# Patient Record
Sex: FEMALE | Race: BLACK OR AFRICAN AMERICAN | NOT HISPANIC OR LATINO | URBAN - METROPOLITAN AREA
[De-identification: names, ages, dates, MRNs, and addresses within clinical notes are randomized per-mention and may not be internally consistent; named-entity substitution may affect disease eponyms.]

---

## 2019-11-04 ENCOUNTER — APPOINTMENT (RX ONLY)
Dept: URBAN - METROPOLITAN AREA CLINIC 1 | Facility: CLINIC | Age: 24
Setting detail: DERMATOLOGY
End: 2019-11-04

## 2019-11-04 DIAGNOSIS — L21.8 OTHER SEBORRHEIC DERMATITIS: ICD-10-CM | Status: IMPROVED

## 2019-11-04 DIAGNOSIS — L81.0 POSTINFLAMMATORY HYPERPIGMENTATION: ICD-10-CM | Status: IMPROVED

## 2019-11-04 PROBLEM — E13.9 OTHER SPECIFIED DIABETES MELLITUS WITHOUT COMPLICATIONS: Status: ACTIVE | Noted: 2019-11-04

## 2019-11-04 PROCEDURE — 99213 OFFICE O/P EST LOW 20 MIN: CPT

## 2019-11-04 PROCEDURE — ? TREATMENT REGIMEN

## 2019-11-04 PROCEDURE — ? PRESCRIPTION

## 2019-11-04 PROCEDURE — ? COUNSELING

## 2019-11-04 RX ORDER — SULFACETAMIDE SODIUM, SULFUR 100; 50 MG/G; MG/G
EMULSION TOPICAL
Qty: 1 | Refills: 0 | Status: ERX | COMMUNITY
Start: 2019-11-04

## 2019-11-04 RX ORDER — HYDROCORTISONE BUTYRATE 1 MG/G
CREAM TOPICAL
Qty: 1 | Refills: 0 | Status: ERX | COMMUNITY
Start: 2019-11-04

## 2019-11-04 RX ADMIN — SULFACETAMIDE SODIUM, SULFUR: 100; 50 EMULSION TOPICAL at 14:29

## 2019-11-04 RX ADMIN — HYDROCORTISONE BUTYRATE: 1 CREAM TOPICAL at 14:29

## 2019-11-04 ASSESSMENT — LOCATION SIMPLE DESCRIPTION DERM
LOCATION SIMPLE: LEFT SCALP
LOCATION SIMPLE: LEFT CHEEK
LOCATION SIMPLE: RIGHT CHEEK

## 2019-11-04 ASSESSMENT — LOCATION DETAILED DESCRIPTION DERM
LOCATION DETAILED: LEFT CENTRAL MALAR CHEEK
LOCATION DETAILED: RIGHT CENTRAL MALAR CHEEK
LOCATION DETAILED: LEFT CENTRAL FRONTAL SCALP

## 2019-11-04 ASSESSMENT — LOCATION ZONE DERM
LOCATION ZONE: SCALP
LOCATION ZONE: FACE

## 2019-11-04 NOTE — PROCEDURE: TREATMENT REGIMEN
Detail Level: Zone
Plan: 11/4/19\\nBetter\\nPlan\\nContinue ketoconozole cream
Plan: 9/30/19\\nciclopirox 1 % shampoo TP Frequency: Once weekly Indication: Seborrheic dermatitis Sig: Wash scalp at least once weekly with shampoo. Let sit on scalp for 10 minutes then rinse. (2 refills)\\nbetamethasone dipropionate 0.05 % topical ointment TP Frequency: Three times a week Sig: Apply to scalp 3 x a week, at night (2 refills)\\n\\n11/4/19\\nLess than itching or flaking \\nPlan\\nStart applying avar cleanser to face bid \\nStart applying hydrocortisone apply to face bid x 2 weeks, then decrease to QD x 2 weeks \\nContinue Ketoconozole cream apply to face bid x 2 weeks, then decrease to QD x 2 weeks

## 2019-11-04 NOTE — HPI: RASH (SEBORRHEIC DERMATITIS)
How Severe Is It?: moderate
Is This A New Presentation, Or A Follow-Up?: Follow Up Seborrheic Dermatitis
Additional History: Last visit  9/30/19 ciclopirox 1 % shampoo TP Frequency: Once weekly Indication: Seborrheic dermatitis Sig: Wash scalp at least once weekly with shampoo. Let sit on scalp for 10 minutes then rinse. (2 refills) betamethasone dipropionate 0.05 % topical ointment TP Frequency: Three times a week Sig: Apply to scalp 3 x a week, at night (2 refills) \\n\\n11/4/19\\nPatient states she stops using the topicals because everything cleared and she has no itching

## 2019-11-04 NOTE — HPI: DISCOLORATION
How Severe Is Your Skin Discoloration?: moderate
Additional History: Last visit 9/30/19 Apply Ketoconazole cream to face apply morning and evening, every other day.\\n\\n11/4/19\\nPatient states she stopped the ketoconozole cream because one area cleared

## 2019-12-09 ENCOUNTER — APPOINTMENT (RX ONLY)
Dept: URBAN - METROPOLITAN AREA CLINIC 1 | Facility: CLINIC | Age: 24
Setting detail: DERMATOLOGY
End: 2019-12-09

## 2019-12-09 DIAGNOSIS — L21.8 OTHER SEBORRHEIC DERMATITIS: ICD-10-CM | Status: IMPROVED

## 2019-12-09 DIAGNOSIS — L81.0 POSTINFLAMMATORY HYPERPIGMENTATION: ICD-10-CM

## 2019-12-09 PROCEDURE — 99213 OFFICE O/P EST LOW 20 MIN: CPT

## 2019-12-09 PROCEDURE — ? COUNSELING

## 2019-12-09 PROCEDURE — ? TREATMENT REGIMEN

## 2019-12-09 PROCEDURE — ? PRESCRIPTION

## 2019-12-09 RX ORDER — MOMETASONE FUROATE 1 MG/G
OINTMENT TOPICAL
Qty: 1 | Refills: 0 | Status: ERX | COMMUNITY
Start: 2019-12-09

## 2019-12-09 RX ADMIN — MOMETASONE FUROATE: 1 OINTMENT TOPICAL at 00:00

## 2019-12-09 ASSESSMENT — LOCATION ZONE DERM
LOCATION ZONE: FACE
LOCATION ZONE: SCALP

## 2019-12-09 ASSESSMENT — LOCATION DETAILED DESCRIPTION DERM
LOCATION DETAILED: RIGHT SUPERIOR LATERAL FOREHEAD
LOCATION DETAILED: LEFT SUPERIOR FOREHEAD
LOCATION DETAILED: RIGHT CENTRAL FRONTAL SCALP

## 2019-12-09 ASSESSMENT — LOCATION SIMPLE DESCRIPTION DERM
LOCATION SIMPLE: LEFT FOREHEAD
LOCATION SIMPLE: RIGHT FOREHEAD
LOCATION SIMPLE: RIGHT SCALP

## 2019-12-09 NOTE — PROCEDURE: TREATMENT REGIMEN
Continue Regimen: Continue loprox shampoo qweekly qnd Betamethasone ointment qhs.
Detail Level: Zone
Initiate Treatment: Mometasone ointment bid x 1 week then decrease to qd x 1 week
Plan: Pt had not started the hydrocortisone cr or Avar cleanser. She had not gotten it from the pharmacy.

## 2019-12-09 NOTE — HPI: DISCOLORATION
How Severe Is Your Skin Discoloration?: moderate
Additional History: Pt states that the ketoconazole cream is applied once a day. Pt uses Aveeno brightening  cleanser and moisturizer on face. Wants ck

## 2020-01-06 ENCOUNTER — APPOINTMENT (RX ONLY)
Dept: URBAN - METROPOLITAN AREA CLINIC 1 | Facility: CLINIC | Age: 25
Setting detail: DERMATOLOGY
End: 2020-01-06

## 2020-01-06 DIAGNOSIS — L21.8 OTHER SEBORRHEIC DERMATITIS: ICD-10-CM | Status: IMPROVED

## 2020-01-06 DIAGNOSIS — L81.0 POSTINFLAMMATORY HYPERPIGMENTATION: ICD-10-CM

## 2020-01-06 PROCEDURE — ? COUNSELING

## 2020-01-06 PROCEDURE — 99213 OFFICE O/P EST LOW 20 MIN: CPT

## 2020-01-06 PROCEDURE — ? TREATMENT REGIMEN

## 2020-01-06 ASSESSMENT — LOCATION DETAILED DESCRIPTION DERM
LOCATION DETAILED: RIGHT SUPERIOR LATERAL FOREHEAD
LOCATION DETAILED: LEFT SUPERIOR FOREHEAD
LOCATION DETAILED: LEFT INFERIOR MEDIAL FOREHEAD
LOCATION DETAILED: RIGHT CENTRAL FRONTAL SCALP
LOCATION DETAILED: LEFT INFERIOR CENTRAL MALAR CHEEK

## 2020-01-06 ASSESSMENT — LOCATION SIMPLE DESCRIPTION DERM
LOCATION SIMPLE: RIGHT FOREHEAD
LOCATION SIMPLE: LEFT FOREHEAD
LOCATION SIMPLE: LEFT CHEEK
LOCATION SIMPLE: RIGHT SCALP

## 2020-01-06 ASSESSMENT — LOCATION ZONE DERM
LOCATION ZONE: SCALP
LOCATION ZONE: FACE

## 2020-01-06 NOTE — PROCEDURE: TREATMENT REGIMEN
Plan: 1/6/20:  \\nFace—improved though still appears dry\\nScalp—Better\\nPlan:  \\nCiclopirox shampoo. Weekly. 10 mins on, rinse\\nBetamethasone oint. QHS. \\nFace:  \\nd/c ketoconazole cream\\nHydrocortisone cream qd x 2 weeks to affected areas on face after moisturizer\\nContinue Aveeno cleanser \\nMoisturizer Aveeno with sunscreen qam\\nRecommend CeraVe PM moisturizer and coupon given
Detail Level: Zone
Discontinue Regimen: Ketoconazole cream
Plan: Hydrocortisone cream qd x 2 weeks. Then prn \\nWash Aveeno. Bid\\nMoisturizer Aveeno,  qam, cerave QHS

## 2020-02-17 ENCOUNTER — APPOINTMENT (RX ONLY)
Dept: URBAN - METROPOLITAN AREA CLINIC 1 | Facility: CLINIC | Age: 25
Setting detail: DERMATOLOGY
End: 2020-02-17

## 2020-02-17 DIAGNOSIS — L81.0 POSTINFLAMMATORY HYPERPIGMENTATION: ICD-10-CM

## 2020-02-17 DIAGNOSIS — L21.8 OTHER SEBORRHEIC DERMATITIS: ICD-10-CM | Status: IMPROVED

## 2020-02-17 PROCEDURE — 99213 OFFICE O/P EST LOW 20 MIN: CPT

## 2020-02-17 PROCEDURE — ? PRESCRIPTION

## 2020-02-17 PROCEDURE — ? TREATMENT REGIMEN

## 2020-02-17 PROCEDURE — ? COUNSELING

## 2020-02-17 RX ORDER — HYDROQUINONE 4 %
CREAM (GRAM) TOPICAL
Qty: 1 | Refills: 0 | Status: ERX | COMMUNITY
Start: 2020-02-17

## 2020-02-17 RX ADMIN — Medication: at 00:00

## 2020-02-17 ASSESSMENT — LOCATION DETAILED DESCRIPTION DERM
LOCATION DETAILED: RIGHT SUPERIOR LATERAL FOREHEAD
LOCATION DETAILED: LEFT INFERIOR MEDIAL FOREHEAD
LOCATION DETAILED: RIGHT CENTRAL FRONTAL SCALP
LOCATION DETAILED: LEFT SUPERIOR FOREHEAD
LOCATION DETAILED: LEFT INFERIOR CENTRAL MALAR CHEEK

## 2020-02-17 ASSESSMENT — LOCATION ZONE DERM
LOCATION ZONE: SCALP
LOCATION ZONE: FACE

## 2020-02-17 ASSESSMENT — LOCATION SIMPLE DESCRIPTION DERM
LOCATION SIMPLE: LEFT CHEEK
LOCATION SIMPLE: RIGHT SCALP
LOCATION SIMPLE: RIGHT FOREHEAD
LOCATION SIMPLE: LEFT FOREHEAD

## 2020-02-17 NOTE — PROCEDURE: TREATMENT REGIMEN
Plan: 1/6/20:  \\nFace—improved though still appears dry\\nScalp—Better\\nPlan:  \\nCiclopirox shampoo. Weekly. 10 mins on, rinse\\nBetamethasone oint. QHS. \\nFace:  \\nd/c ketoconazole cream\\nHydrocortisone cream qd x 2 weeks to affected areas on face after moisturizer\\nContinue Aveeno cleanser \\nMoisturizer Aveeno with sunscreen qam\\nRecommend CeraVe PM moisturizer and coupon given\\n\\n2/17/20 \\nScalp improved\\nFace improved, still have some dry patches around nose \\nContinue ciclopirox shampoo to scalp weekly-bi weekly \\nContinue betamethasone oint to scalp \\nRestart hydrocortisone cream to face bid x1 week\\nContinue Aveeno cleanser and face moisturizer with spf
Discontinue Regimen: Ketoconazole cream
Detail Level: Zone
Plan: Hydrocortisone cream qd x 2 weeks. Then prn \\nWash Aveeno. Bid\\nMoisturizer Aveeno,  qam, cerave QHS\\n\\n2/17/20 better,\\nStart HQ 4% cream to dark spots to forehead and cheeks QHS, apply sunscreen qam

## 2020-09-15 ENCOUNTER — RX ONLY (OUTPATIENT)
Age: 25
Setting detail: RX ONLY
End: 2020-09-15

## 2020-09-15 RX ORDER — CICLOPIROX 10 MG/.96ML
SHAMPOO TOPICAL
Qty: 1 | Refills: 0 | Status: ERX | COMMUNITY
Start: 2020-09-15

## 2020-09-15 RX ORDER — BETAMETHASONE DIPROPIONATE 0.5 MG/G
OINTMENT TOPICAL
Qty: 1 | Refills: 0 | Status: ERX | COMMUNITY
Start: 2020-09-15

## 2020-10-29 ENCOUNTER — RX ONLY (OUTPATIENT)
Age: 25
Setting detail: RX ONLY
End: 2020-10-29

## 2020-10-29 ENCOUNTER — APPOINTMENT (RX ONLY)
Dept: URBAN - METROPOLITAN AREA CLINIC 1 | Facility: CLINIC | Age: 25
Setting detail: DERMATOLOGY
End: 2020-10-29

## 2020-10-29 DIAGNOSIS — L81.0 POSTINFLAMMATORY HYPERPIGMENTATION: ICD-10-CM | Status: IMPROVED

## 2020-10-29 DIAGNOSIS — L21.8 OTHER SEBORRHEIC DERMATITIS: ICD-10-CM | Status: WELL CONTROLLED

## 2020-10-29 PROCEDURE — ? ADDITIONAL NOTES

## 2020-10-29 PROCEDURE — ? TREATMENT REGIMEN

## 2020-10-29 PROCEDURE — ? COUNSELING

## 2020-10-29 PROCEDURE — ? PRESCRIPTION

## 2020-10-29 PROCEDURE — 99213 OFFICE O/P EST LOW 20 MIN: CPT

## 2020-10-29 RX ORDER — BETAMETHASONE DIPROPIONATE 0.5 MG/G
OINTMENT TOPICAL
Qty: 1 | Refills: 2 | Status: ERX

## 2020-10-29 RX ORDER — PIMECROLIMUS 10 MG/G
CREAM TOPICAL
Qty: 1 | Refills: 2 | Status: ACTIVE

## 2020-10-29 RX ORDER — HYDROQUINONE 4 %
CREAM (GRAM) TOPICAL
Qty: 1 | Refills: 2 | Status: ERX

## 2020-10-29 RX ORDER — CICLOPIROX 10 MG/.96ML
SHAMPOO TOPICAL
Qty: 1 | Refills: 2 | Status: ERX

## 2020-10-29 RX ORDER — HYDROQUINONE 4 %
CREAM (GRAM) TOPICAL
Qty: 1 | Refills: 0 | Status: CANCELLED
Stop reason: SDUPTHER

## 2020-10-29 ASSESSMENT — LOCATION ZONE DERM
LOCATION ZONE: SCALP
LOCATION ZONE: FACE

## 2020-10-29 ASSESSMENT — LOCATION DETAILED DESCRIPTION DERM
LOCATION DETAILED: LEFT INFERIOR CENTRAL MALAR CHEEK
LOCATION DETAILED: RIGHT SUPERIOR LATERAL FOREHEAD
LOCATION DETAILED: LEFT SUPERIOR FOREHEAD
LOCATION DETAILED: RIGHT CENTRAL FRONTAL SCALP
LOCATION DETAILED: LEFT INFERIOR MEDIAL FOREHEAD

## 2020-10-29 ASSESSMENT — LOCATION SIMPLE DESCRIPTION DERM
LOCATION SIMPLE: LEFT FOREHEAD
LOCATION SIMPLE: RIGHT SCALP
LOCATION SIMPLE: LEFT CHEEK
LOCATION SIMPLE: RIGHT FOREHEAD

## 2020-10-29 NOTE — PROCEDURE: TREATMENT REGIMEN
Plan: 1/6/20:  \\nFace—improved though still appears dry\\nScalp—Better\\nPlan:  \\nCiclopirox shampoo. Weekly. 10 mins on, rinse\\nBetamethasone oint. QHS. \\nFace:  \\nd/c ketoconazole cream\\nHydrocortisone cream qd x 2 weeks to affected areas on face after moisturizer\\nContinue Aveeno cleanser \\nMoisturizer Aveeno with sunscreen qam\\nRecommend CeraVe PM moisturizer and coupon given\\n\\n2/17/20 \\nScalp improved\\nFace improved, still have some dry patches around nose \\nContinue ciclopirox shampoo to scalp weekly-bi weekly \\nContinue betamethasone oint to scalp \\nRestart hydrocortisone cream to face bid x1 week\\nContinue Aveeno cleanser and face moisturizer with spf\\n\\n10/29/2020\\nPatient will use both HC and elidel together for 2 weeks, the d/c HC until needed
Discontinue Regimen: Ketoconazole cream
Detail Level: Zone
Plan: Hydrocortisone cream qd x 2 weeks. Then prn \\nWash Aveeno. Bid\\nMoisturizer Aveeno,  qam, cerave QHS\\n\\n2/17/20 better,\\nStart HQ 4% cream to dark spots to forehead and cheeks QHS, apply sunscreen qam

## 2021-01-06 ENCOUNTER — RX ONLY (OUTPATIENT)
Age: 26
Setting detail: RX ONLY
End: 2021-01-06

## 2021-01-06 RX ORDER — CICLOPIROX 10 MG/.96ML
SHAMPOO TOPICAL WEEKLY
Qty: 1 | Refills: 3 | Status: ERX